# Patient Record
Sex: MALE | Race: WHITE
[De-identification: names, ages, dates, MRNs, and addresses within clinical notes are randomized per-mention and may not be internally consistent; named-entity substitution may affect disease eponyms.]

---

## 2020-09-06 ENCOUNTER — HOSPITAL ENCOUNTER (EMERGENCY)
Dept: HOSPITAL 41 - JD.ED | Age: 50
Discharge: NURSING, REHAB OR LONG-TERM CARE | End: 2020-09-06
Payer: COMMERCIAL

## 2020-09-06 DIAGNOSIS — Z20.828: ICD-10-CM

## 2020-09-06 DIAGNOSIS — I46.9: Primary | ICD-10-CM

## 2020-09-06 DIAGNOSIS — I21.11: ICD-10-CM

## 2020-09-06 DIAGNOSIS — J96.01: ICD-10-CM

## 2020-09-06 PROCEDURE — 31500 INSERT EMERGENCY AIRWAY: CPT

## 2020-09-06 PROCEDURE — 99285 EMERGENCY DEPT VISIT HI MDM: CPT

## 2020-09-06 PROCEDURE — 85025 COMPLETE CBC W/AUTO DIFF WBC: CPT

## 2020-09-06 PROCEDURE — U0002 COVID-19 LAB TEST NON-CDC: HCPCS

## 2020-09-06 PROCEDURE — 80053 COMPREHEN METABOLIC PANEL: CPT

## 2020-09-06 PROCEDURE — 92950 HEART/LUNG RESUSCITATION CPR: CPT

## 2020-09-06 PROCEDURE — 71045 X-RAY EXAM CHEST 1 VIEW: CPT

## 2020-09-06 PROCEDURE — 87635 SARS-COV-2 COVID-19 AMP PRB: CPT

## 2020-09-06 PROCEDURE — 84484 ASSAY OF TROPONIN QUANT: CPT

## 2020-09-06 PROCEDURE — 36415 COLL VENOUS BLD VENIPUNCTURE: CPT

## 2020-09-06 PROCEDURE — 93005 ELECTROCARDIOGRAM TRACING: CPT

## 2020-09-06 NOTE — EDM.PDOC
ED HPI GENERAL MEDICAL PROBLEM





- General


Chief Complaint: CPR in Progress


Stated Complaint: [facility]


Time Seen by Provider: 09/06/20 09:56


Source of Information: Reports: Patient, EMS


History Limitations: Reports: Altered Mental Status





- History of Present Illness


INITIAL COMMENTS - FREE TEXT/NARRATIVE: 





The patient presents by [facility] for cardiac arrest and return of 

spontaneous circulation.  The patient called out ER and said he was coming in 

for chest pain.  He called for help on the way and [facility] 

intercepted with him and [facility] ambulance intercepted with them.  The 

patient went unresponsive and lost a pulse.  CPR was started right away.  The 

patient went into V-fib and was defibrillated twice.  He was also given 

epinephrine and amiodarone 300mg.  He got a pulse back and was alert and 

talking.  He was alert and talking when he came in.  He was confused on a 

nonrebreather and his oxygen saturations were low.  He was saying he was short 

of breath and he needed help breathing.  He could not provide any further 

information.  I did call his father and he said he has never had heart problems 

before.  He thought he may be on a medicine for hypertension.  He did not smoke.


Onset: Sudden


Duration: Minutes:


Location: Reports: Chest


Quality: Reports: Sharp


Severity: Moderate


Improves with: Reports: None


Worsens with: Reports: None


Associated Symptoms: Reports: Chest Pain, Shortness of Breath.  Denies: Cough, 

Fever/Chills, Headaches, Loss of Appetite, Nausea/Vomiting





ED ROS GENERAL





- Review of Systems


Review Of Systems: Unable To Obtain


Reason Not Obtained: Patient was confused





ED EXAM, CPR





- Physical Exam


Exam: See Below


Limited By: Respiratory Distress (moderate)


General Appearance: Alert, Other (confused)


Ears: Normal External Exam


Nose: Normal Inspection


Throat/Mouth: Normal Inspection


Head: Atraumatic, Normocephalic


Respiratory Chest: Respiratory Distress (moderate), Decreased Breath Sounds


Cardiovascular: No Edema, No Murmur, No Rub, Bradycardia


GI/Abdominal Exam: Soft, Non-Tender, No Organomegaly, No Mass


Extremities: Normal Inspection





ED CPR PROCEDURES





- Endotracheal Intubation


Time of Intubation: 09:00


ET Intubation Indication: Respiratory Failure


Preparation: Suction, Balloon Tested, BVM Set Up, Difficult Airway Equip


Pre-Oxygenation: Other (nonrebreather)


Anesthesia Meds: Etomidate, Succinylcholine


Placement: Orotracheal, Cuffed, Uncomplicated Placement


Cords Visualized: Yes


ETT Size In mm: 8


Number of Attempts: 1


Confirmed By: CO2 Indicator, Bilateral Breath Sounds, Chest Xray


Tube Secured By: By RT





EKG INTERPRETATION


EKG Date: 09/06/20


Time: 09:34


Rhythm: Other (sinus bradycardia)


Rate (Beats/Min): 51


Axis: Normal


P-Wave: Present


QRS: Wide


ST-T: Elevated (ST elevation in the inferior leads)


QT: Normal





Course





- Orders/Labs/Meds


Orders: 


                               Active Orders 24 hr











 Category Date Time Status


 


 Cardiac Monitoring [RC] .AS DIRECTED Care  09/06/20 09:59 Active


 


 EKG Documentation Completion [RC] STAT Care  09/06/20 10:00 Active


 


 Oxygen Therapy [RC] PRN Care  09/06/20 09:59 Active


 


 Peripheral IV Care [RC] .AS DIRECTED Care  09/06/20 10:00 Active


 


 Chest 1V Frontal [CR] Stat Exams  09/06/20 10:00 Taken


 


 Sodium Chloride 0.9% [Saline Flush] Med  09/06/20 09:59 Active





 10 ml FLUSH ASDIRECTED PRN   


 


 Peripheral IV Insertion Adult [OM.PC] Stat Oth  09/06/20 09:59 Ordered








                                Medication Orders





Sodium Chloride (Saline Flush)  10 ml FLUSH ASDIRECTED PRN


   PRN Reason: Keep Vein Open








Labs: 


                                Laboratory Tests











  09/06/20 09/06/20 09/06/20 Range/Units





  09:57 09:57 10:20 


 


WBC  10.26 H    (4.23-9.07)  K/mm3


 


RBC  4.77    (4.63-6.08)  M/mm3


 


Hgb  14.1    (13.7-17.5)  gm/dl


 


Hct  41.9    (40.1-51.0)  %


 


MCV  87.8    (79.0-92.2)  fl


 


MCH  29.6    (25.7-32.2)  pg


 


MCHC  33.7    (32.2-35.5)  g/dl


 


RDW Std Deviation  40.4    (35.1-43.9)  fL


 


Plt Count  202    (163-337)  K/mm3


 


MPV  9.5    (9.4-12.3)  fl


 


Neut % (Auto)  26.7 L    (34.0-67.9)  %


 


Lymph % (Auto)  64.7 H    (21.8-53.1)  %


 


Mono % (Auto)  3.9 L    (5.3-12.2)  %


 


Eos % (Auto)  2.0    (0.8-7.0)  


 


Baso % (Auto)  0.5    (0.1-1.2)  %


 


Neut # (Auto)  2.73    (1.78-5.38)  K/mm3


 


Lymph # (Auto)  6.64 H    (1.32-3.57)  K/mm3


 


Mono # (Auto)  0.40    (0.30-0.82)  K/mm3


 


Eos # (Auto)  0.21    (0.04-0.54)  K/mm3


 


Baso # (Auto)  0.05    (0.01-0.08)  K/mm3


 


Manual Slide Review  Normal smear    


 


Sodium   138   (136-145)  mEq/L


 


Potassium   3.3 L   (3.5-5.1)  mEq/L


 


Chloride   101   ()  mEq/L


 


Carbon Dioxide   13 L   (21-32)  mEq/L


 


Anion Gap   27.3 H   (5-15)  


 


BUN   21 H   (7-18)  mg/dL


 


Creatinine   2.1 H   (0.7-1.3)  mg/dL


 


Est Cr Clr Drug Dosing   TNP   


 


Estimated GFR (MDRD)   34   (>60)  mL/min


 


BUN/Creatinine Ratio   10.0 L   (14-18)  


 


Glucose   380 H   ()  mg/dL


 


Calcium   7.6 L   (8.5-10.1)  mg/dL


 


Total Bilirubin   0.6   (0.2-1.0)  mg/dL


 


AST   78 H   (15-37)  U/L


 


ALT   99 H   (16-63)  U/L


 


Alkaline Phosphatase   73   ()  U/L


 


Troponin I   0.039   (0.00-0.056)  ng/mL


 


Total Protein   5.7 L   (6.4-8.2)  g/dl


 


Albumin   2.9 L   (3.4-5.0)  g/dl


 


Globulin   2.8   gm/dL


 


Albumin/Globulin Ratio   1.0   (1-2)  


 


COVID-19 ([facility])    Negative  (NEGATIVE)  











Meds: 


Medications











Generic Name Dose Route Start Last Admin





  Trade Name Freq  PRN Reason Stop Dose Admin


 


Sodium Chloride  10 ml  09/06/20 09:59 





  Saline Flush  FLUSH  





  ASDIRECTED PRN  





  Keep Vein Open  














Discontinued Medications














Generic Name Dose Route Start Last Admin





  Trade Name Freq  PRN Reason Stop Dose Admin


 


Aspirin  300 mg  09/06/20 10:02 





  Aspirin  RECTAL  09/06/20 10:03 





  ONETIME ONE  


 


Aspirin  Confirm  09/06/20 10:06 





  Aspirin  Administered  09/06/20 10:07 





  Dose  





  300 mg  





  .ROUTE  





  .STK-MED ONE  


 


Clopidogrel Bisulfate  300 mg  09/06/20 10:02 





  Plavix  PO  09/06/20 10:03 





  ONETIME ONE  


 


Clopidogrel Bisulfate  Confirm  09/06/20 10:05 





  Plavix  Administered  09/06/20 10:06 





  Dose  





  300 mg  





  .ROUTE  





  .STK-MED ONE  


 


Lactated Ringer's  Confirm  09/06/20 10:56 





  Ringers, Lactated  Administered  09/06/20 10:57 





  Dose  





  1,000 mls @ as directed  





  .ROUTE  





  .STK-MED ONE  














- Re-Assessments/Exams


Free Text/Narrative Re-Assessment/Exam: 





09/06/20 11:31


A medical alert was called.  [facility] was activated.  I ordered an EKG, CXR, 

labs, amiodarone drip.  He was alert and talking but confused.  He was saying he

 was short of breath.  I elected to secure his airway.  I used etomidate and 

succinylcholine.  The intubation went fine.  He did lose a pulse after 

intubation.  CPR was started and he was in a PEA.  He was given a couple doses 

of epi and a pulse returned.  His BP was 105 systolic and he was in a sinus 

isrrael in the 30s.  I did give him some atropine and his heart rate went into the

 60s.  His EKG shows a sinus bradycardia with ST elevation in the inferior 

leads.  I ordered tnKase per weight based protocol, heparin bolus, drip and 

aspirin per rectum.  His CXR shows good ET tube placement.  The NG tube looped 

back into the mouth.  My nurse corrected that problem.





I called [facility] in [place] and talked with Dr Boudreaux.  He wanted the patient to 

have some plavix 300mg per NG.


09/06/20 11:37


When I went back in the room the patient's heart rate went slow and he lost a 

pulse again.  CPR was started with the Alok device we had on from the start.  

We got a pulse back but he lost a pulse back a few more times and he was sincere in 

v-fib a few times and needed to be defibrillated multiple times.  His BP was low

 between those times and a dopamine drip was started.  He was also given an 

amiodarone drip.





The patient lost a pulse many times.  [facility] crew did not feel he was 

stable enough to move from the ambulance and back and into the aircraft.  They 

elected to go by ground ambulance with there crew and another paramedic from 

[facility].





I did talk to his father and daughter by phone they both liver in [place].  Dad 

has a flight for later today into [place].  Critical care time is 90 minutes.





Departure





- Departure


Time of Disposition: 23:45


Disposition: DC/Tfer to Acute Hospital [facility]


Condition: Critical


Clinical Impression: 


 Cardiac arrest





Respiratory failure


Qualifiers:


 Chronicity: acute Respiratory failure complication: hypoxia Qualified Code(s): 

J96.01 - Acute respiratory failure with hypoxia





STEMI (ST elevation myocardial infarction)


Qualifiers:


 Involved coronary artery: right coronary artery Qualified Code(s): I21.11 - ST 

elevation (STEMI) myocardial infarction involving right coronary artery








- Discharge Information


Referrals: 


PCP,Not In Area [Primary Care Provider] - 


Forms:  ED Department Discharge





- My Orders


Last 24 Hours: 


My Active Orders





09/06/20 09:59


Cardiac Monitoring [RC] .AS DIRECTED 


Oxygen Therapy [RC] PRN 


Sodium Chloride 0.9% [Saline Flush]   10 ml FLUSH ASDIRECTED PRN 


Peripheral IV Insertion Adult [OM.PC] Stat 





09/06/20 10:00


EKG Documentation Completion [RC] STAT 


Peripheral IV Care [RC] .AS DIRECTED 


Chest 1V Frontal [CR] Stat 














- Assessment/Plan


Last 24 Hours: 


My Active Orders





09/06/20 09:59


Cardiac Monitoring [RC] .AS DIRECTED 


Oxygen Therapy [RC] PRN 


Sodium Chloride 0.9% [Saline Flush]   10 ml FLUSH ASDIRECTED PRN 


Peripheral IV Insertion Adult [OM.PC] Stat 





09/06/20 10:00


EKG Documentation Completion [RC] STAT 


Peripheral IV Care [RC] .AS DIRECTED 


Chest 1V Frontal [CR] Stat

## 2020-09-07 NOTE — CR
Chest: Portable supine view of the chest was obtained.

 

Comparison: No prior chest imaging is available.

 

Cardiac silhouette and mediastinum are normal.  

 

Endotracheal tube is seen with tip lying at the lower level of the 

clavicles above the rich.  

 

Tubing is seen within the neck possibly due to a coiled NG tube within

 the hypopharynx.  

 

Lungs show slight left basilar atelectasis.  Lungs otherwise are 

clear.  Bony structures are grossly intact.

 

Impression:

1.  Satisfactory position of endotracheal tube.

2.  Coiled tubing within the neck possibly due to coiled nasogastric 

tube.  Please correlate.

3.  Minimal left basilar atelectasis.

 

Diagnostic code #3

 

This report was dictated in MDT